# Patient Record
Sex: FEMALE | Race: WHITE | NOT HISPANIC OR LATINO | URBAN - METROPOLITAN AREA
[De-identification: names, ages, dates, MRNs, and addresses within clinical notes are randomized per-mention and may not be internally consistent; named-entity substitution may affect disease eponyms.]

---

## 2024-08-26 ENCOUNTER — EVALUATION (OUTPATIENT)
Dept: PHYSICAL THERAPY | Facility: REHABILITATION | Age: 24
End: 2024-08-26
Payer: COMMERCIAL

## 2024-08-26 DIAGNOSIS — F64.9 GENDER DYSPHORIA: ICD-10-CM

## 2024-08-26 DIAGNOSIS — M62.89 PELVIC FLOOR DYSFUNCTION: Primary | ICD-10-CM

## 2024-08-26 PROCEDURE — 97112 NEUROMUSCULAR REEDUCATION: CPT | Performed by: PHYSICAL THERAPIST

## 2024-08-26 PROCEDURE — 97162 PT EVAL MOD COMPLEX 30 MIN: CPT | Performed by: PHYSICAL THERAPIST

## 2024-08-26 NOTE — LETTER
2024    Alma Ho MD  [address] [place] [id number]    Patient: Brandee Prince   YOB: 2000   Date of Visit: 2024     Encounter Diagnosis     ICD-10-CM    1. Pelvic floor dysfunction  M62.89       2. Gender dysphoria  F64.9           Dear Dr. Ho:    Thank you for your recent referral of Brandee Prince. Please review the attached evaluation summary from Brandee's recent visit.     Please verify that you agree with the plan of care by signing the attached order.     If you have any questions or concerns, please do not hesitate to call.     I sincerely appreciate the opportunity to share in the care of one of your patients and hope to have another opportunity to work with you in the near future.       Sincerely,    Selma Hernandez, PT      Referring Provider:      I certify that I have read the below Plan of Care and certify the need for these services furnished under this plan of treatment while under my care.                    Alma Ho MD  [facility] [address] [address]  Via Fax: [phone]          PT Evaluation     Today's date: 2024  Patient name: Brandee Prince  : 2000  MRN: [id number]  Referring provider: Alma Ho MD  Dx:   Encounter Diagnosis     ICD-10-CM    1. Pelvic floor dysfunction  M62.89       2. Gender dysphoria  F64.9                      Assessment    Assessment details: Brandee Prince is a 24 y.o. seeking pelvic floor muscle examination in anticipation of vaginoplasty for gender affirmation surgery.  Patient's presentation is consistent with mild pelvic floor muscle overactivity with the following impairments found on evaluation: mild hypertonicity, poor lengthening with attempted bear down intra-rectally, decreased abdominal excursion with diaphragmatic breathing. Addressing these impairments can optimize post-op vaginal dilator training. Brandee Prince is a  good candidate for physical therapy and would benefit from skilled physical therapy (specifically, PFM AROM/Downtraining) to address the above impairments in order to allow the patient to achieve the goals listed and return to prior level of function.      During initial evaluation, education was provided on anatomy and function of the pelvic floor muscles, viscerosomatic convergence and regional interdependence of the lumbo-pelvic-hip complex.  Patient also educated on diagnosis, plan of care and prognosis. Patient provided written and verbal consent for pelvic floor muscle exam. They are in agreement with recommended plan of care and goals for therapy, and demonstrates motivation for active participation in proposed plan of care.  Understanding of Dx/Px/POC: good     Prognosis: good    Goals  1. Lengthening of PFM palpated intra-rectally with bear down in 6 weeks.  2. PF AROM improves 25% in 6 weeks.  3. Patient demonstrates pelvic drop technique for use with post-op vaginal dilator training.    Plan    Frequency: 1x week  Duration in weeks: 8  Plan of Care beginning date: 8/26/2024  Plan of Care expiration date: 10/21/2024  Treatment plan discussed with: referring physician and patient        PT Pelvic Floor Subjective:   History of Present Illness:   Planning for a vaginoplasty in 6 months, but date is not set yet. Medical transition x3 years- was on estrogen patches and now injections.    Denies any urinary or bowel dysfunction. Not currently sexually active but plans to engage post-vaginoplasty.  Lives in [facility] but works in [employer] 12 hr shift 3-4 times per week in . Walks for exercise- 2-3 miles approx 4-5 times per week.    Follows up with Dr. Ho after pelvic PT consult to set surgery date.  Pain:     No pain reported by patient.      Objective       Abdominal Assessment:      Abdominal Assessment: 50% limited diaphragmatic excursion/abdominal excursion  Curl up: min strength  deficits    Diastatis   Diastasis recti present? no       General Perineum Exam:   perineum intact.   Negative for swelling, hemorrhoids and perianal erythema    Visual Inspection of Perineum:   Excursion of perineal body in cephalad direction with contraction of pelvic floor muscles (PFM): fair   Excursion of perineal body in caudal direction with relaxation of pelvic floor muscles (PFM): fair   Involuntary contraction with coughing: yes  Involuntary relaxation with bearing down: no  PFM Contraction Comments: Paradoxical VA contraction with bear down; fair IAP generation for bear down  Mild hypertonicity of VA/PC  Perineal body inspection: within normal limits        Pelvic Floor Muscle Exam:     Muscle Contraction: well isolated   Breathing pattern with contraction: apical   Pelvic floor muscle relaxation is incomplete.   75% pelvic floor relaxation        PERFECT Score   Power right: 3+/5   Power left: 3+/5   Endurance (seconds to max): 4   Repetitions (before fatigue): 8       Rectal Pelvic Floor Muscle Exam  no hemorrhoids    pelvic floor exam consent given by patient    Pelvic exam completed: rectally                Precautions: gender dysmorphia    POC expires  Auth Status? (BOMN, approved, pending) Unit limit (Daily) Auth Start date Expiration date PT/OT + Visit Limit?   10/21/24 BOMN    30 pcy     Date of Service 8/26        Visits Used 1        Visits Remaining 29        Patient Education         Medbridge acct created?         PFM anatomy and function 5'                          Neuro Re-Ed         DB 5 min w DKTC, CP  HEP        Bear down mechanics                                             Ther Ex         Dilator training Discussed post-op expectations                                            Ther Activity                                    Manual Ther         PFM exam completed                          Modalities

## 2024-08-26 NOTE — PROGRESS NOTES
PT Evaluation     Today's date: 2024  Patient name: Brandee Prince  : 2000  MRN: [id number]  Referring provider: Alma Ho MD  Dx:   Encounter Diagnosis     ICD-10-CM    1. Pelvic floor dysfunction  M62.89       2. Gender dysphoria  F64.9                      Assessment    Assessment details: Brandee Prince is a 24 y.o. seeking pelvic floor muscle examination in anticipation of vaginoplasty for gender affirmation surgery.  Patient's presentation is consistent with mild pelvic floor muscle overactivity with the following impairments found on evaluation: mild hypertonicity, poor lengthening with attempted bear down intra-rectally, decreased abdominal excursion with diaphragmatic breathing. Addressing these impairments can optimize post-op vaginal dilator training. Brandee Prince is a good candidate for physical therapy and would benefit from skilled physical therapy (specifically, PFM AROM/Downtraining) to address the above impairments in order to allow the patient to achieve the goals listed and return to prior level of function.      During initial evaluation, education was provided on anatomy and function of the pelvic floor muscles, viscerosomatic convergence and regional interdependence of the lumbo-pelvic-hip complex.  Patient also educated on diagnosis, plan of care and prognosis. Patient provided written and verbal consent for pelvic floor muscle exam. They are in agreement with recommended plan of care and goals for therapy, and demonstrates motivation for active participation in proposed plan of care.  Understanding of Dx/Px/POC: good     Prognosis: good    Goals  1. Lengthening of PFM palpated intra-rectally with bear down in 6 weeks.  2. PF AROM improves 25% in 6 weeks.  3. Patient demonstrates pelvic drop technique for use with post-op vaginal dilator training.    Plan    Frequency: 1x week  Duration in weeks: 8  Plan of Care beginning date: 2024  Plan of Care expiration date:  10/21/2024  Treatment plan discussed with: referring physician and patient        PT Pelvic Floor Subjective:   History of Present Illness:   Planning for a vaginoplasty in 6 months, but date is not set yet. Medical transition x3 years- was on estrogen patches and now injections.    Denies any urinary or bowel dysfunction. Not currently sexually active but plans to engage post-vaginoplasty.  Lives in [facility] but works in [employer] 12 hr shift 3-4 times per week in . Walks for exercise- 2-3 miles approx 4-5 times per week.    Follows up with Dr. Ho after pelvic PT consult to set surgery date.  Pain:     No pain reported by patient.      Objective       Abdominal Assessment:      Abdominal Assessment: 50% limited diaphragmatic excursion/abdominal excursion  Curl up: min strength deficits    Diastatis   Diastasis recti present? no       General Perineum Exam:   perineum intact.   Negative for swelling, hemorrhoids and perianal erythema    Visual Inspection of Perineum:   Excursion of perineal body in cephalad direction with contraction of pelvic floor muscles (PFM): fair   Excursion of perineal body in caudal direction with relaxation of pelvic floor muscles (PFM): fair   Involuntary contraction with coughing: yes  Involuntary relaxation with bearing down: no  PFM Contraction Comments: Paradoxical WI contraction with bear down; fair IAP generation for bear down  Mild hypertonicity of WI/PC  Perineal body inspection: within normal limits        Pelvic Floor Muscle Exam:     Muscle Contraction: well isolated   Breathing pattern with contraction: apical   Pelvic floor muscle relaxation is incomplete.   75% pelvic floor relaxation        PERFECT Score   Power right: 3+/5   Power left: 3+/5   Endurance (seconds to max): 4   Repetitions (before fatigue): 8       Rectal Pelvic Floor Muscle Exam  no hemorrhoids    pelvic floor exam consent given by patient    Pelvic exam completed: rectally                 Precautions: gender dysmorphia    POC expires  Auth Status? (BOMN, approved, pending) Unit limit (Daily) Auth Start date Expiration date PT/OT + Visit Limit?   10/21/24 BOMN    30 pcy     Date of Service 8/26        Visits Used 1        Visits Remaining 29        Patient Education         Medbridge acct created?         PFM anatomy and function 5'                          Neuro Re-Ed         DB 5 min w DKTC, CP  HEP        Bear down mechanics                                             Ther Ex         Dilator training Discussed post-op expectations                                            Ther Activity                                    Manual Ther         PFM exam completed                          Modalities

## 2024-09-25 ENCOUNTER — OFFICE VISIT (OUTPATIENT)
Dept: PHYSICAL THERAPY | Facility: REHABILITATION | Age: 24
End: 2024-09-25
Payer: COMMERCIAL

## 2024-09-25 DIAGNOSIS — M62.89 PELVIC FLOOR DYSFUNCTION: Primary | ICD-10-CM

## 2024-09-25 DIAGNOSIS — F64.9 GENDER DYSPHORIA: ICD-10-CM

## 2024-09-25 PROCEDURE — 97112 NEUROMUSCULAR REEDUCATION: CPT | Performed by: PHYSICAL THERAPIST

## 2024-09-25 NOTE — PROGRESS NOTES
Daily Note     Today's date: 2024  Patient name: Lexi Hannon  : 2000  MRN: [id number]  Referring provider: Rocio Nice MD  Dx:   Encounter Diagnosis     ICD-10-CM    1. Pelvic floor dysfunction  M62.89       2. Gender dysphoria  F64.9         Subjective: Has been practicing diaphragmatic breathing. No surgery date yet.    Objective: See treatment diary below    Assessment: Tolerated treatment well. Surface EMG biofeedback was used to augment PFM relaxation and lengthening NMR and was performed in supine and seated position. Patient presents with 7.0 uV resting activity level at baseline, decreasing to 3.0mV resting activity in supine post-treatment. Improved rate and depth of relax phase post-contraction achieved in supine, more difficult in sitting.    Patient to continue with DB for HEP, and intermittent PFM AROM (seated a few times per week), and bear down mechanics with daily defecation.    Plan: Continue per plan of care.  PFM exam next visit.     Precautions: gender dysmorphia    POC expires  Auth Status? (BOMN, approved, pending) Unit limit (Daily) Auth Start date Expiration date PT/OT + Visit Limit?   10/21/24 BOMN    30 pcy     Date of Service        Visits Used 1 2       Visits Remaining 29 28       Patient Education         Medbridge acct created?         PFM anatomy and function 5'                          Neuro Re-Ed         DB 5 min w DKTC, CP  HEP Supine 5 min       Bear down mechanics  Seated practiced       sEMG BFB  Supine 3x5 reps  Seated 2x5 reps                                  Ther Ex         Dilator training Discussed post-op expectations                                            Ther Activity                                    Manual Ther         PFM exam completed  **                        Modalities

## 2024-10-16 ENCOUNTER — OFFICE VISIT (OUTPATIENT)
Dept: PHYSICAL THERAPY | Facility: REHABILITATION | Age: 24
End: 2024-10-16
Payer: COMMERCIAL

## 2024-10-16 DIAGNOSIS — M62.89 PELVIC FLOOR DYSFUNCTION: Primary | ICD-10-CM

## 2024-10-16 DIAGNOSIS — F64.9 GENDER DYSPHORIA: ICD-10-CM

## 2024-10-16 PROCEDURE — 97112 NEUROMUSCULAR REEDUCATION: CPT | Performed by: PHYSICAL THERAPIST

## 2024-10-16 NOTE — PROGRESS NOTES
Daily Note     Today's date: 10/16/2024  Patient name: Lexi Hannon  : 2000  MRN: [id number]  Referring provider: Rocio Nice MD  Dx:   Encounter Diagnosis     ICD-10-CM    1. Pelvic floor dysfunction  M62.89       2. Gender dysphoria  F64.9           Subjective: Has been practicing diaphragmatic breathing. No surgery date yet.    Objective: See treatment diary below    Assessment: Tolerated treatment well. PF muscle resting tone is WNL, AROM is WNL. Bear down mechanics are correct 75% of the time, with error being paradoxical contraction (mildly).     Plan: Discharge- return 4 weeks post-op.     Precautions: gender dysmorphia    POC expires  Auth Status? (BOMN, approved, pending) Unit limit (Daily) Auth Start date Expiration date PT/OT + Visit Limit?   10/21/24 BOMN    30 pcy     Date of Service 8/26 9/25 10/16      Visits Used 1 2 3      Visits Remaining 29 28 27      Patient Education         Medbridge acct created?         PFM anatomy and function 5'                          Neuro Re-Ed         DB 5 min w DKTC, CP  HEP Supine 5 min       Bear down mechanics  Seated practiced Practiced x6      sEMG BFB  Supine 3x5 reps  Seated 2x5 reps                                  Ther Ex         Dilator training Discussed post-op expectations  reviewed                                          Ther Activity                                    Manual Ther         PFM exam completed  completed                        Modalities

## 2025-02-21 ENCOUNTER — OFFICE VISIT (OUTPATIENT)
Dept: PHYSICAL THERAPY | Facility: REHABILITATION | Age: 25
End: 2025-02-21
Payer: COMMERCIAL

## 2025-02-21 DIAGNOSIS — Z98.890 HISTORY OF VAGINOPLASTY: ICD-10-CM

## 2025-02-21 DIAGNOSIS — N89.5 VAGINAL STENOSIS: Primary | ICD-10-CM

## 2025-02-21 PROCEDURE — 97140 MANUAL THERAPY 1/> REGIONS: CPT | Performed by: PHYSICAL THERAPIST

## 2025-02-21 PROCEDURE — 97162 PT EVAL MOD COMPLEX 30 MIN: CPT | Performed by: PHYSICAL THERAPIST

## 2025-02-21 PROCEDURE — 97110 THERAPEUTIC EXERCISES: CPT | Performed by: PHYSICAL THERAPIST

## 2025-02-21 NOTE — PROGRESS NOTES
PT Evaluation     Today's date: 2025  Patient name: Lexi Hannon  : 2000  MRN: [id number]  Referring provider: Rocio Nice MD  Dx:   Encounter Diagnosis     ICD-10-CM    1. Vaginal stenosis  N89.5       2. History of vaginoplasty  Z98.890           Assessment    Assessment details: Lexi returns to pelvic PT 4 weeks s/p vaginoplasty with mild pelvic pain, mild vaginal scarring/stenosis. She is compliant with vaginal dilation and presents with mild pelvic floor muscle tension/overactivity. Today, we reviewed education on healing timeframes, precautions, and dilator technique variations to carefully bias areas of restriction with varied pressure. She would benefit from skilled PT to progress scar mobilization, and to add edema management strategies such as MLD and compression to labia, pending surgeon's clearance.  Understanding of Dx/Px/POC: good     Prognosis: good    Goals  1. Patient will be able to insert purple dilator to Dot #3 in 5 minutes painfree in 8 weeks.  2. Patient will demonstrate consistent PFM lengthening with bear down in 4 weeks.  3. Patient will report 50% improvement in labial edema in 8 weeks.    Plan    Frequency: 2x month  Duration in weeks: 12  Plan of Care beginning date: 2025  Plan of Care expiration date: 2025  Treatment plan discussed with: patient and referring physician        PT Pelvic Floor Subjective:   History of Present Illness:   s/p robotic assisted peritoneal flap vaginoplasty on 25. Has had 3 post-op follow ups with surgeons. Reports no complications, good healing, mild pale brown/yellow discharge, no hypergranulation tissue. Normal bladder function and mild constipation that is improving. Denies pain with sitting or ADLs.     Dilating 1 hour per day, in one bout. Takes 10-15 min to fully insert to 3rd dot of purple dilator with 3/10. Then holds it there for 45 min.     Returns to work in 1 month-- feels ready.   Pain:     Current pain  ratin    At best pain ratin    At worst pain rating:  3  Patient Goals:     Patient goals for therapy:  Decreased pain and improved quality of life      Objective       Abdominal Assessment:          General Perineum Exam:     General perineum exam comments: Labia edema bilaterally; labial sutures in place, some areas of incision are still closing/healing, no signs/symptoms of infection. No external hypergranulation tissue visible.    Visual Inspection of Perineum:   Excursion of perineal body in cephalad direction with contraction of pelvic floor muscles (PFM): fair   Excursion of perineal body in caudal direction with relaxation of pelvic floor muscles (PFM): fair   Involuntary contraction with coughing: yes  Involuntary relaxation with bearing down: inconsistent, requires cueing.  PFM Contraction Comments: Introitus/posterior fourchette is mildly restricted, then good width/mobility of distal vaginal canal and deep vaginal canal per digital palpation.  Perineal body inspection: within normal limits        Pelvic Floor Muscle Exam:             pelvic floor exam consent given by patient    Pelvic exam completed: vaginally                Precautions: early post-op    Manuals         PFM exam completed        Intravaginal STM 10'                          Neuro Re-Ed         DB         Pelvic drop         Bear down mechanics                                             Ther Ex         Vaginal dilator techniques 15'                                            Ther Activity         Education: healing timeframes and precautions 10'

## 2025-02-21 NOTE — LETTER
2025    Rocio Nice MD  [address] [address]    Patient: Lexi Hannon   YOB: 2000   Date of Visit: 2025     Encounter Diagnosis     ICD-10-CM    1. Vaginal stenosis  N89.5       2. History of vaginoplasty  Z98.890           Dear Dr. Nice:    Thank you for your recent referral of Lexi Hannon. Please review the attached evaluation summary from Lexi's recent visit. Please verify that you agree with the plan of care by signing the attached order.     If you have any questions or concerns, please do not hesitate to call. I sincerely appreciate the opportunity to share in the care of one of your patients and hope to have another opportunity to work with you in the near future.     Sincerely,  Lucinda Martínez, PT      Referring Provider:      I certify that I have read the below Plan of Care and certify the need for these services furnished under this plan of treatment while under my care.                    Rocio Nice MD  [address] [address]  Via Fax: [phone]          PT Evaluation     Today's date: 2025  Patient name: Lexi Hannon  : 2000  MRN: [id number]  Referring provider: Rocio Nice MD  Dx:   Encounter Diagnosis     ICD-10-CM    1. Vaginal stenosis  N89.5       2. History of vaginoplasty  Z98.890           Assessment    Assessment details: Lexi returns to pelvic PT 4 weeks s/p vaginoplasty with mild pelvic pain, mild vaginal scarring/stenosis. She is compliant with vaginal dilation and presents with mild pelvic floor muscle tension/overactivity. Today, we reviewed education on healing timeframes, precautions, and dilator technique variations to carefully bias areas of restriction with varied pressure. She would benefit from skilled PT to progress scar mobilization, and to add edema management strategies such as MLD and compression to labia, pending  surgeon's clearance.  Understanding of Dx/Px/POC: good     Prognosis: good    Goals  1. Patient will be able to insert purple dilator to Dot #3 in 5 minutes painfree in 8 weeks.  2. Patient will demonstrate consistent PFM lengthening with bear down in 4 weeks.  3. Patient will report 50% improvement in labial edema in 8 weeks.    Plan    Frequency: 2x month  Duration in weeks: 12  Plan of Care beginning date: 2025  Plan of Care expiration date: 2025  Treatment plan discussed with: patient and referring physician        PT Pelvic Floor Subjective:   History of Present Illness:   s/p robotic assisted peritoneal flap vaginoplasty on 25. Has had 3 post-op follow ups with surgeons. Reports no complications, good healing, mild pale brown/yellow discharge, no hypergranulation tissue. Normal bladder function and mild constipation that is improving. Denies pain with sitting or ADLs.     Dilating 1 hour per day, in one bout. Takes 10-15 min to fully insert to 3rd dot of purple dilator with 3/10. Then holds it there for 45 min.     Returns to work in 1 month-- feels ready.   Pain:     Current pain ratin    At best pain ratin    At worst pain rating:  3  Patient Goals:     Patient goals for therapy:  Decreased pain and improved quality of life      Objective       Abdominal Assessment:          General Perineum Exam:     General perineum exam comments: Labia edema bilaterally; labial sutures in place, some areas of incision are still closing/healing, no signs/symptoms of infection. No external hypergranulation tissue visible.    Visual Inspection of Perineum:   Excursion of perineal body in cephalad direction with contraction of pelvic floor muscles (PFM): fair   Excursion of perineal body in caudal direction with relaxation of pelvic floor muscles (PFM): fair   Involuntary contraction with coughing: yes  Involuntary relaxation with bearing down: inconsistent, requires cueing.  PFM Contraction Comments:  Introitus/posterior fourchette is mildly restricted, then good width/mobility of distal vaginal canal and deep vaginal canal per digital palpation.  Perineal body inspection: within normal limits        Pelvic Floor Muscle Exam:             pelvic floor exam consent given by patient    Pelvic exam completed: vaginally                Precautions: early post-op    Manuals 2/21        PFM exam completed        Intravaginal STM 10'                          Neuro Re-Ed         DB         Pelvic drop         Bear down mechanics                                             Ther Ex         Vaginal dilator techniques 15'                                            Ther Activity         Education: healing timeframes and precautions 10'

## 2025-03-07 ENCOUNTER — APPOINTMENT (OUTPATIENT)
Dept: PHYSICAL THERAPY | Facility: REHABILITATION | Age: 25
End: 2025-03-07
Payer: COMMERCIAL

## 2025-03-14 ENCOUNTER — OFFICE VISIT (OUTPATIENT)
Dept: PHYSICAL THERAPY | Facility: REHABILITATION | Age: 25
End: 2025-03-14
Payer: COMMERCIAL

## 2025-03-14 DIAGNOSIS — N89.5 VAGINAL STENOSIS: Primary | ICD-10-CM

## 2025-03-14 DIAGNOSIS — Z98.890 HISTORY OF VAGINOPLASTY: ICD-10-CM

## 2025-03-14 PROCEDURE — 97140 MANUAL THERAPY 1/> REGIONS: CPT | Performed by: PHYSICAL THERAPIST

## 2025-03-14 PROCEDURE — 97110 THERAPEUTIC EXERCISES: CPT | Performed by: PHYSICAL THERAPIST

## 2025-03-14 NOTE — PROGRESS NOTES
"Daily Note     Today's date: 3/14/2025  Patient name: Lexi Hannon  : 2000  MRN: [id number]  Referring provider: Rocio Nice MD  Dx:   Encounter Diagnosis     ICD-10-CM    1. Vaginal stenosis  N89.5       2. History of vaginoplasty  Z98.890           Subjective: Dilation takes 15 min to insert with 1/10 pain, then performing 1 hr of dilation without pain. Added more focused pressure points with good tolerance.    Objective: See treatment diary below    Assessment: Tolerated treatment well. R>L iliococcygeus hypertonicity improved with pelvic drop and DB cueing intermittently during manual therapy. Otherwise, vaginal canal and PFM has very good mobility. Adding \"sweeping\"/gliding technique with dilator-- shallow or deep. Also performed modified/ simplified MLD for labial edema and trained patient in self-MLD, handout provided.    Plan: Continue per plan of care.      Precautions: early post-op    Manuals 2/21 3/14       PFM exam completed        Intravaginal STM 10' 30'       MLD  10'                Neuro Re-Ed         DB         Pelvic drop         Bear down mechanics         PF AROM  X10 reps supine                                  Ther Ex         Vaginal dilator techniques 15' 10'       Self MLD  Discussed; handout provided                                  Ther Activity         Education: healing timeframes and precautions 10'                                        "

## 2025-03-21 ENCOUNTER — OFFICE VISIT (OUTPATIENT)
Dept: PHYSICAL THERAPY | Facility: REHABILITATION | Age: 25
End: 2025-03-21
Payer: COMMERCIAL

## 2025-03-21 DIAGNOSIS — N89.5 VAGINAL STENOSIS: Primary | ICD-10-CM

## 2025-03-21 DIAGNOSIS — Z98.890 HISTORY OF VAGINOPLASTY: ICD-10-CM

## 2025-03-21 PROCEDURE — 97140 MANUAL THERAPY 1/> REGIONS: CPT | Performed by: PHYSICAL THERAPIST

## 2025-03-21 PROCEDURE — 97110 THERAPEUTIC EXERCISES: CPT | Performed by: PHYSICAL THERAPIST

## 2025-03-21 NOTE — PROGRESS NOTES
Daily Note     Today's date: 3/21/2025  Patient name: Lexi Hannon  : 2000  MRN: [id number]  Referring provider: Rocio Nice MD  Dx:   Encounter Diagnosis     ICD-10-CM    1. Vaginal stenosis  N89.5       2. History of vaginoplasty  Z98.890           Subjective: Feels ready to move up in dilator size after seeing Dr. Nice next week. Feeling confident with long-term rehab independently. Denies bowel/bladder dysfunction.    Objective: See treatment diary below    Assessment: Tolerated treatment well. Completely asymptomatic with all internal manual therapy, with excellent tissue mobility. PF contraction is WNL in coordination and strength. PFM lengthening with bear down remains mildly incoordinated and moderated limited in ROM, but does not correlate with current functional status so OK to discontinue interventions for this at this time.    Plan: follow up PRN until POC ext 25     Precautions: early post-op    Manuals 2/21 3/14 3/21      PFM exam completed        Intravaginal STM 10' 30' 30'      MLD  10'                Neuro Re-Ed         DB         Pelvic drop         Bear down mechanics         PF AROM  X10 reps supine X5 contract; x5 bear down                                 Ther Ex         Vaginal dilator techniques 15' 10' 5'      Self MLD  Discussed; handout provided                                  Ther Activity         Education: healing timeframes and precautions 10'